# Patient Record
Sex: MALE | Race: WHITE
[De-identification: names, ages, dates, MRNs, and addresses within clinical notes are randomized per-mention and may not be internally consistent; named-entity substitution may affect disease eponyms.]

---

## 2018-07-18 ENCOUNTER — HOSPITAL ENCOUNTER (EMERGENCY)
Dept: HOSPITAL 56 - MW.ED | Age: 43
Discharge: HOME | End: 2018-07-18
Payer: SELF-PAY

## 2018-07-18 DIAGNOSIS — Z88.0: ICD-10-CM

## 2018-07-18 DIAGNOSIS — K03.81: Primary | ICD-10-CM

## 2018-07-18 DIAGNOSIS — K02.9: ICD-10-CM

## 2018-07-18 PROCEDURE — 99282 EMERGENCY DEPT VISIT SF MDM: CPT

## 2018-07-18 NOTE — EDM.PDOC
ED HPI GENERAL MEDICAL PROBLEM





- General


Chief Complaint: ENT Problem


Stated Complaint: TOOTH PAIN


Time Seen by Provider: 07/18/18 18:18


Source of Information: Reports: Patient


History Limitations: Reports: No Limitations





- History of Present Illness


INITIAL COMMENTS - FREE TEXT/NARRATIVE: 


History of present illness:


[]Patient started having severe dental pain today. He denies any drainage, 

fevers or dental trauma.





Review of systems: 


As per history of present illness and below otherwise all systems reviewed and 

negative.





Past medical history: 


As per history of present illness and as reviewed below otherwise 

noncontributory.





Surgical history: 


As per history of present illness and as reviewed below otherwise 

noncontributory.





Social history: 


No reported history of drug or alcohol abuse.





Family history: 


As per history of present illness and as reviewed below otherwise 

noncontributory.





Physical exam:


General: Well developed, well nourished in NAD


HEENT: Atraumatic, normocephalic, pupils reactive, negative for conjunctival 

pallor or scleral icterus, mucous membranes moist, throat clear, neck supple, 

nontender, trachea midline. Right lower molar with caries and noted cracks 

visible there is no gingival erythema or swelling or drainage


Lungs: Clear to auscultation, breath sounds equal bilaterally, chest nontender.


Heart: S1S2, regular, negative for clicks, rubs, or JVD.


Abdomen: Soft, nondistended, nontender. Negative for masses or 

hepatosplenomegaly. Negative for costovertebral tenderness.


Pelvis: Stable nontender.


Genitourinary: Deferred.


Rectal: Deferred.


Extremities: Atraumatic, negative for cords or calf pain. Neurovascular 

unremarkable.


Neuro: Awake, alert, oriented. Cranial nerves II through XII unremarkable. 

Cerebellum unremarkable. Motor and sensory unremarkable throughout. Exam 

nonfocal.





Diagnostics:


[]





Therapeutics:


[]Dental block given with half percent bupivacaine without epi and 1% lidocaine 

with epi 3 mL injected into





Impression: 


[]Dental pain





Plan:


[]Clindamycin as directed, dental balls, Motrin for pain. Follow-up with a 

dentist ASAP.





Definitive disposition and diagnosis as appropriate pending reevaluation and 

review of above.





  ** right dental


Pain Score (Numeric/FACES): 10





- Related Data


 Allergies











Allergy/AdvReac Type Severity Reaction Status Date / Time


 


Penicillins Allergy  Nausea and Verified 07/18/18 18:33





   Vomiting  











Home Meds: 


 Home Meds





Clindamycin HCl 300 mg PO TID #30 capsule 07/18/18 [Rx]











Past Medical History


HEENT History: Reports: None


Cardiovascular History: Reports: None


Respiratory History: Reports: None


Gastrointestinal History: Reports: None


Genitourinary History: Reports: None


Musculoskeletal History: Reports: None


Neurological History: Reports: Other (See Below)


Other Neuro History: hx brain tumor


Psychiatric History: Reports: None


Endocrine/Metabolic History: Reports: None


Hematologic History: Reports: None


Immunologic History: Reports: None


Oncologic (Cancer) History: Reports: None


Dermatologic History: Reports: None





- Past Surgical History


Head Surgeries/Procedures: Reports: None


HEENT Surgical History: Reports: None


Cardiovascular Surgical History: Reports: None


Respiratory Surgical History: Reports: None


GI Surgical History: Reports: None


Male  Surgical History: Reports: None


Endocrine Surgical History: Reports: None


Neurological Surgical History: Reports: None


Musculoskeletal Surgical History: Reports: None


Oncologic Surgical History: Reports: None


Dermatological Surgical History: Reports: None





Social & Family History





- Family History


Family Medical History: Noncontributory





- Tobacco Use


Smoking Status *Q: Never Smoker


Second Hand Smoke Exposure: No





- Caffeine Use


Caffeine Use: Reports: None





- Recreational Drug Use


Recreational Drug Use: No





ED ROS ENT





- Review of Systems


Review Of Systems: ROS reveals no pertinent complaints other than HPI.





ED EXAM, ENT





- Physical Exam


Exam: See Below (See history of present illness)





Course





- Vital Signs


Last Recorded V/S: 





 Last Vital Signs











Temp  98.8 F   07/18/18 18:33


 


Pulse  94   07/18/18 18:33


 


Resp  20   07/18/18 18:33


 


BP  171/89 H  07/18/18 18:33


 


Pulse Ox  96   07/18/18 18:33














- Orders/Labs/Meds


Meds: 





Medications














Discontinued Medications














Generic Name Dose Route Start Last Admin





  Trade Name Kainq  PRN Reason Stop Dose Admin


 


Benzocaine  2 each  07/18/18 18:37  





  Hurricaine One 20%  MUCMEM  07/18/18 18:38  





  ONETIME ONE   





     





     





     





     


 


Bupivacaine HCl  10 ml  07/18/18 18:30  





  Sensorcaine-Mpf 0.5%  INJECT  07/18/18 18:31  





  ONETIME ONE   





     





     





     





     


 


Lidocaine HCl  15 ml  07/18/18 18:37  





  Xylocaine 2% Viscous  PO  07/18/18 18:38  





  ONETIME ONE   





     





     





     





     


 


Lidocaine/Epinephrine  10 ml  07/18/18 18:32  





  Xylocaine 1% With Epinephrine 1:100,000  INJECT  07/18/18 18:33  





  ONETIME ONE   





     





     





     





     














Departure





- Departure


Time of Disposition: 18:41


Disposition: Home, Self-Care 01


Condition: Good


Clinical Impression: 


 Pain, dental








- Discharge Information


*PRESCRIPTION DRUG MONITORING PROGRAM REVIEWED*: Not Applicable


Prescriptions: 


Clindamycin HCl 300 mg PO TID #30 capsule


Referrals: 


PCP,None [Primary Care Provider] - 


Additional Instructions: 


The following information is given to patients seen in the emergency department 

who are being discharged to home. This information is to outline your options 

for follow-up care. We provide all patients seen in our emergency department 

with a follow-up referral.





The need for follow-up, as well as the timing and circumstances, are variable 

depending upon the specifics of your emergency department visit.





If you don't have a primary care physician on staff, we will provide you with a 

referral. We always advise you to contact your personal physician following an 

emergency department visit to inform them of the circumstance of the visit and 

for follow-up with them and/or the need for any referrals to a consulting 

specialist.





The emergency department will also refer you to a specialist when appropriate. 

This referral assures that you have the opportunity for follow-up care with a 

specialist. All of these measure are taken in an effort to provide you with 

optimal care, which includes your follow-up.





Under all circumstances we always encourage you to contact your private 

physician who remains a resource for coordinating your care. When calling for 

follow-up care, please make the office aware that this follow-up is from your 

recent emergency room visit. If for any reason you are refused follow-up, 

please contact the [facility] Emergency 

Department at [phone] and asked to speak to the emergency department 

charge nurse.





Take clindamycin as directed use dental balls for pain as needed, follow up 

with a dentist as soon as possible or follow-up with primary care for pain 

management.





[facility]


Primary Care


[address] [place] [id number]


Phone: [phone]


Fax: [phone]